# Patient Record
Sex: MALE | Race: WHITE | NOT HISPANIC OR LATINO | Employment: FULL TIME | URBAN - METROPOLITAN AREA
[De-identification: names, ages, dates, MRNs, and addresses within clinical notes are randomized per-mention and may not be internally consistent; named-entity substitution may affect disease eponyms.]

---

## 2022-01-19 ENCOUNTER — OFFICE VISIT (OUTPATIENT)
Dept: PHYSICAL MEDICINE AND REHAB | Facility: CLINIC | Age: 37
End: 2022-01-19
Payer: MEDICAID

## 2022-01-19 VITALS — WEIGHT: 190 LBS | BODY MASS INDEX: 29.82 KG/M2 | RESPIRATION RATE: 18 BRPM | HEIGHT: 67 IN

## 2022-01-19 DIAGNOSIS — M51.16 LUMBAR DISC HERNIATION WITH RADICULOPATHY: Primary | ICD-10-CM

## 2022-01-19 DIAGNOSIS — M43.06 LUMBAR PARS DEFECT: ICD-10-CM

## 2022-01-19 DIAGNOSIS — R20.2 PARESTHESIA: ICD-10-CM

## 2022-01-19 DIAGNOSIS — M54.17 LUMBOSACRAL RADICULOPATHY AT L4: ICD-10-CM

## 2022-01-19 PROCEDURE — 99213 OFFICE O/P EST LOW 20 MIN: CPT | Mod: PBBFAC,PN | Performed by: PHYSICAL MEDICINE & REHABILITATION

## 2022-01-19 PROCEDURE — 99999 PR PBB SHADOW E&M-EST. PATIENT-LVL III: CPT | Mod: PBBFAC,,, | Performed by: PHYSICAL MEDICINE & REHABILITATION

## 2022-01-19 PROCEDURE — 99999 PR PBB SHADOW E&M-EST. PATIENT-LVL III: ICD-10-PCS | Mod: PBBFAC,,, | Performed by: PHYSICAL MEDICINE & REHABILITATION

## 2022-01-19 PROCEDURE — 1160F RVW MEDS BY RX/DR IN RCRD: CPT | Mod: CPTII,,, | Performed by: PHYSICAL MEDICINE & REHABILITATION

## 2022-01-19 PROCEDURE — 99204 PR OFFICE/OUTPT VISIT, NEW, LEVL IV, 45-59 MIN: ICD-10-PCS | Mod: S$PBB,,, | Performed by: PHYSICAL MEDICINE & REHABILITATION

## 2022-01-19 PROCEDURE — 99204 OFFICE O/P NEW MOD 45 MIN: CPT | Mod: S$PBB,,, | Performed by: PHYSICAL MEDICINE & REHABILITATION

## 2022-01-19 PROCEDURE — 1159F MED LIST DOCD IN RCRD: CPT | Mod: CPTII,,, | Performed by: PHYSICAL MEDICINE & REHABILITATION

## 2022-01-19 PROCEDURE — 1159F PR MEDICATION LIST DOCUMENTED IN MEDICAL RECORD: ICD-10-PCS | Mod: CPTII,,, | Performed by: PHYSICAL MEDICINE & REHABILITATION

## 2022-01-19 PROCEDURE — 1160F PR REVIEW ALL MEDS BY PRESCRIBER/CLIN PHARMACIST DOCUMENTED: ICD-10-PCS | Mod: CPTII,,, | Performed by: PHYSICAL MEDICINE & REHABILITATION

## 2022-01-19 PROCEDURE — 3008F BODY MASS INDEX DOCD: CPT | Mod: CPTII,,, | Performed by: PHYSICAL MEDICINE & REHABILITATION

## 2022-01-19 PROCEDURE — 3008F PR BODY MASS INDEX (BMI) DOCUMENTED: ICD-10-PCS | Mod: CPTII,,, | Performed by: PHYSICAL MEDICINE & REHABILITATION

## 2022-01-19 RX ORDER — PREDNISONE 20 MG/1
40 TABLET ORAL DAILY
COMMUNITY
Start: 2022-01-08

## 2022-01-19 RX ORDER — CYCLOBENZAPRINE HCL 10 MG
10 TABLET ORAL 3 TIMES DAILY PRN
Qty: 45 TABLET | Refills: 0 | Status: SHIPPED | OUTPATIENT
Start: 2022-01-19

## 2022-01-19 RX ORDER — IBUPROFEN 800 MG/1
800 TABLET ORAL EVERY 8 HOURS PRN
COMMUNITY
Start: 2022-01-08

## 2022-01-19 RX ORDER — TRAMADOL HYDROCHLORIDE 50 MG/1
50 TABLET ORAL EVERY 8 HOURS PRN
Qty: 20 TABLET | Refills: 0 | OUTPATIENT
Start: 2022-01-19 | End: 2022-06-01

## 2022-01-20 NOTE — PROGRESS NOTES
Chief Complaint   Patient presents with    Back Pain     Low back pain    Leg Pain     L leg tingling/burning         HPI: North Mendoza is a  36 y.o. male who presents today for evaluation and treatment of acute left-sided low back pain and left-sided radicular symptoms.  He states that on Friday he was pulling a spring at of the vault forward and felt like there is going to be significant resistance, but there was none.  He quickly jerked his back.  He felt a pop in the left side of his low back in the SI joint region.  The pain in the low back improved, but he developed left buttock pain radiating into the anterior thigh into the medial lower leg with numbness and tingling in the foot.  He states that his left quad feels swollen.  He complains of numbness and tingling/burning in the anterior thigh.  Pain is worse with leaning forward, hip extension.  He denies any weakness of the lower extremities.  He has started a prednisone pack with some improvement in symptoms.  He has been taking ibuprofen 800 mg 3 times daily with some improvement in pain and function.  He denies any bowel or bladder dysfunction, saddle anesthesia, or lower extremity weakness.        Review of Systems   Constitutional: Negative for chills and fever.   HENT: Negative for congestion and tinnitus.    Eyes: Negative for blurred vision and photophobia.   Respiratory: Negative for shortness of breath and wheezing.    Cardiovascular: Negative for chest pain and palpitations.   Gastrointestinal: Negative for nausea and vomiting.   Genitourinary: Negative for dysuria and frequency.   Musculoskeletal: Positive for back pain. Negative for joint pain and myalgias.   Skin: Negative for itching and rash.   Neurological: Positive for tingling and sensory change. Negative for speech change and weakness.   Endo/Heme/Allergies: Negative for environmental allergies. Does not bruise/bleed easily.   Psychiatric/Behavioral: Negative for depression. The patient  is not nervous/anxious.             No past medical history on file.       Current Outpatient Medications on File Prior to Visit   Medication Sig Dispense Refill    ibuprofen (ADVIL,MOTRIN) 800 MG tablet Take 800 mg by mouth every 8 (eight) hours as needed.      predniSONE (DELTASONE) 20 MG tablet Take 40 mg by mouth once daily.       No current facility-administered medications on file prior to visit.              Physical Exam:  Vitals:    01/19/22 1356   Resp: 18     Physical Exam  Constitutional:       General: He is not in acute distress.     Appearance: Normal appearance.   HENT:      Head: Normocephalic and atraumatic.      Nose: Nose normal. No congestion.      Mouth/Throat:      Mouth: Mucous membranes are moist.      Pharynx: Oropharynx is clear.   Eyes:      Extraocular Movements: Extraocular movements intact.      Pupils: Pupils are equal, round, and reactive to light.   Neck:      Trachea: Trachea and phonation normal.   Pulmonary:      Effort: Pulmonary effort is normal. No respiratory distress.   Abdominal:      General: Abdomen is flat. There is no distension.   Musculoskeletal:      Lumbar back: Spasms, tenderness and bony tenderness (Left lower facets) present. Decreased range of motion (Pain with extension and rotation to the left greater than the right.  Range of motion with flexion normal). Negative right straight leg raise test and negative left straight leg raise test.        Back:       Comments: Positive femoral nerve tension test on the left.   Skin:     General: Skin is warm and dry.   Neurological:      General: No focal deficit present.      Mental Status: He is alert and oriented to person, place, and time.      Cranial Nerves: Cranial nerves are intact.      Sensory: Sensory deficit (Left L4 dermatome) present.      Motor: Motor function is intact.      Gait: Gait is intact.      Deep Tendon Reflexes:      Reflex Scores:       Patellar reflexes are 3+ on the right side and 1+ on the  left side.       Achilles reflexes are 2+ on the right side and 2+ on the left side.     Comments: Negative clonus   Psychiatric:         Mood and Affect: Mood and affect normal.         Behavior: Behavior normal.       Back Exam     Tests   Straight leg raise right: negative  Straight leg raise left: negative                  Assessment:  Lumbar disc herniation with radiculopathy  -     cyclobenzaprine (FLEXERIL) 10 MG tablet; Take 1 tablet (10 mg total) by mouth 3 (three) times daily as needed for Muscle spasms.  Dispense: 45 tablet; Refill: 0  -     traMADoL (ULTRAM) 50 mg tablet; Take 1 tablet (50 mg total) by mouth every 8 (eight) hours as needed for Pain.  Dispense: 20 tablet; Refill: 0  -     Ambulatory referral/consult to Physical/Occupational Therapy; Future; Expected date: 01/26/2022    Lumbar pars defect  -     X-Ray Lumbar Spine 5 View; Future; Expected date: 01/19/2022    Lumbosacral radiculopathy at L4    Paresthesia               PLAN:  North Mendoza is a 36 y.o. male with acute left-sided low back pain and left-sided radicular symptoms.  History and physical examination is likely consistent with an L4 radiculopathy.  He fortunately has no red flag signs or symptoms on history or physical examination.  At this time, recommend that he continue with the prednisone taper as prescribed.  I will prescribed him Flexeril 10 mg t.i.d. p.r.n. for muscle spasm in neuropathic pain.  He can take tramadol 50 mg t.i.d. p.r.n. for severe breakthrough pain.  Lastly, will prescribe him physical therapy to focus mainly on Félix extension based exercises.  I will see him back in 4-6 weeks.  Should his pain continue or worsen, I advised him to notify myself.  He was counseled on red flag signs or symptoms.  Lastly, given his significant athletic history with gymnastics, I will order an x-ray of the lumbar spine to assess for spondylolysis or spondylolisthesis.                      Jovanni Montez D.O.  Physical  Medicine and Rehabilitation

## 2022-03-08 ENCOUNTER — OFFICE VISIT (OUTPATIENT)
Dept: PHYSICAL MEDICINE AND REHAB | Facility: CLINIC | Age: 37
End: 2022-03-08
Payer: MEDICAID

## 2022-03-08 VITALS — BODY MASS INDEX: 29.82 KG/M2 | HEIGHT: 67 IN | WEIGHT: 190 LBS | RESPIRATION RATE: 18 BRPM

## 2022-03-08 DIAGNOSIS — M54.16 LUMBAR RADICULOPATHY: Primary | ICD-10-CM

## 2022-03-08 DIAGNOSIS — G89.29 CHRONIC LEFT-SIDED LOW BACK PAIN WITH LEFT-SIDED SCIATICA: ICD-10-CM

## 2022-03-08 DIAGNOSIS — M54.42 CHRONIC LEFT-SIDED LOW BACK PAIN WITH LEFT-SIDED SCIATICA: ICD-10-CM

## 2022-03-08 PROCEDURE — 99999 PR PBB SHADOW E&M-EST. PATIENT-LVL III: ICD-10-PCS | Mod: PBBFAC,,, | Performed by: PHYSICAL MEDICINE & REHABILITATION

## 2022-03-08 PROCEDURE — 99213 OFFICE O/P EST LOW 20 MIN: CPT | Mod: PBBFAC,PN | Performed by: PHYSICAL MEDICINE & REHABILITATION

## 2022-03-08 PROCEDURE — 1160F RVW MEDS BY RX/DR IN RCRD: CPT | Mod: CPTII,,, | Performed by: PHYSICAL MEDICINE & REHABILITATION

## 2022-03-08 PROCEDURE — 1160F PR REVIEW ALL MEDS BY PRESCRIBER/CLIN PHARMACIST DOCUMENTED: ICD-10-PCS | Mod: CPTII,,, | Performed by: PHYSICAL MEDICINE & REHABILITATION

## 2022-03-08 PROCEDURE — 3008F PR BODY MASS INDEX (BMI) DOCUMENTED: ICD-10-PCS | Mod: CPTII,,, | Performed by: PHYSICAL MEDICINE & REHABILITATION

## 2022-03-08 PROCEDURE — 99213 PR OFFICE/OUTPT VISIT, EST, LEVL III, 20-29 MIN: ICD-10-PCS | Mod: S$PBB,,, | Performed by: PHYSICAL MEDICINE & REHABILITATION

## 2022-03-08 PROCEDURE — 1159F MED LIST DOCD IN RCRD: CPT | Mod: CPTII,,, | Performed by: PHYSICAL MEDICINE & REHABILITATION

## 2022-03-08 PROCEDURE — 1159F PR MEDICATION LIST DOCUMENTED IN MEDICAL RECORD: ICD-10-PCS | Mod: CPTII,,, | Performed by: PHYSICAL MEDICINE & REHABILITATION

## 2022-03-08 PROCEDURE — 99213 OFFICE O/P EST LOW 20 MIN: CPT | Mod: S$PBB,,, | Performed by: PHYSICAL MEDICINE & REHABILITATION

## 2022-03-08 PROCEDURE — 99999 PR PBB SHADOW E&M-EST. PATIENT-LVL III: CPT | Mod: PBBFAC,,, | Performed by: PHYSICAL MEDICINE & REHABILITATION

## 2022-03-08 PROCEDURE — 3008F BODY MASS INDEX DOCD: CPT | Mod: CPTII,,, | Performed by: PHYSICAL MEDICINE & REHABILITATION

## 2022-03-08 NOTE — PROGRESS NOTES
Chief Complaint   Patient presents with    Follow-up         HPI: North Mendoza is a  36 y.o. male who presents today for followup. he was last seen in clinic at which time I suspected a left L4 radiculopathy.  On this visit, he states that he is doing fairly well.  He had 1 episode of distal quadriceps weakness/burning a week ago after some single leg squats which has resolved.  He denies any new worsening bowel or bladder dysfunction, saddle anesthesia, or lower extremity weakness at this time.  Pain is a 0/10        Review of Systems   Constitutional: Negative for chills and fever.   HENT: Negative for congestion and tinnitus.    Eyes: Negative for blurred vision and photophobia.   Respiratory: Negative for shortness of breath and wheezing.    Cardiovascular: Negative for chest pain and palpitations.   Gastrointestinal: Negative for nausea and vomiting.   Genitourinary: Negative for dysuria and frequency.   Musculoskeletal: Negative for back pain, joint pain and myalgias.   Skin: Negative for itching and rash.   Neurological: Negative for tingling, sensory change, speech change and weakness.   Endo/Heme/Allergies: Negative for environmental allergies. Does not bruise/bleed easily.   Psychiatric/Behavioral: Negative for depression. The patient is not nervous/anxious.             No past medical history on file.       Current Outpatient Medications on File Prior to Visit   Medication Sig Dispense Refill    cyclobenzaprine (FLEXERIL) 10 MG tablet Take 1 tablet (10 mg total) by mouth 3 (three) times daily as needed for Muscle spasms. 45 tablet 0    ibuprofen (ADVIL,MOTRIN) 800 MG tablet Take 800 mg by mouth every 8 (eight) hours as needed.      predniSONE (DELTASONE) 20 MG tablet Take 40 mg by mouth once daily.      traMADoL (ULTRAM) 50 mg tablet Take 1 tablet (50 mg total) by mouth every 8 (eight) hours as needed for Pain. 20 tablet 0     No current facility-administered medications on file prior to visit.               Physical Exam:  Vitals:    03/08/22 0836   Resp: 18     Physical Exam  Constitutional:       General: He is not in acute distress.     Appearance: Normal appearance.   HENT:      Head: Normocephalic and atraumatic.      Nose: Nose normal. No congestion.      Mouth/Throat:      Mouth: Mucous membranes are moist.      Pharynx: Oropharynx is clear.   Eyes:      Extraocular Movements: Extraocular movements intact.      Pupils: Pupils are equal, round, and reactive to light.   Neck:      Trachea: Trachea and phonation normal.   Pulmonary:      Effort: Pulmonary effort is normal. No respiratory distress.   Abdominal:      General: Abdomen is flat. There is no distension.   Skin:     General: Skin is warm and dry.   Neurological:      General: No focal deficit present.      Mental Status: He is alert and oriented to person, place, and time.      Cranial Nerves: Cranial nerves are intact.      Sensory: Sensation is intact.      Motor: Motor function is intact.      Gait: Gait is intact.      Deep Tendon Reflexes:      Reflex Scores:       Patellar reflexes are 3+ on the right side and 2+ on the left side.       Achilles reflexes are 2+ on the right side and 2+ on the left side.  Psychiatric:         Mood and Affect: Mood and affect normal.         Behavior: Behavior normal.       Ortho Exam          Assessment:  Lumbar radiculopathy    Chronic left-sided low back pain with left-sided sciatica               PLAN:  North Mendoza is a 36 y.o. male with history of low back pain and left-sided radicular symptoms what sounded like an L4 dermatomal distribution.  On this visit, he complains of some left distal quadriceps weakness which has improved.  On today's visit, sensation, strength, and reflexes were within normal limits.  At this time, recommend continuing activity as tolerated.  Should his symptoms continue or worsen, I will order a EMG of the left lower extremity focusing on the quadriceps and tibialis anterior.   Otherwise return to clinic as needed                      Jovanni Montez D.O.  Physical Medicine and Rehabilitation

## 2022-06-02 ENCOUNTER — OFFICE VISIT (OUTPATIENT)
Dept: PHYSICAL MEDICINE AND REHAB | Facility: CLINIC | Age: 37
End: 2022-06-02
Payer: MEDICAID

## 2022-06-02 VITALS — RESPIRATION RATE: 18 BRPM | HEIGHT: 67 IN | WEIGHT: 190 LBS | BODY MASS INDEX: 29.82 KG/M2

## 2022-06-02 DIAGNOSIS — M54.9 DORSALGIA, UNSPECIFIED: ICD-10-CM

## 2022-06-02 DIAGNOSIS — M54.42 CHRONIC BILATERAL LOW BACK PAIN WITH LEFT-SIDED SCIATICA: ICD-10-CM

## 2022-06-02 DIAGNOSIS — G89.29 CHRONIC BILATERAL LOW BACK PAIN WITH LEFT-SIDED SCIATICA: ICD-10-CM

## 2022-06-02 DIAGNOSIS — M54.17 LUMBOSACRAL RADICULOPATHY AT L4: Primary | ICD-10-CM

## 2022-06-02 PROCEDURE — 99213 OFFICE O/P EST LOW 20 MIN: CPT | Mod: PBBFAC,PN | Performed by: PHYSICAL MEDICINE & REHABILITATION

## 2022-06-02 PROCEDURE — 1159F MED LIST DOCD IN RCRD: CPT | Mod: CPTII,,, | Performed by: PHYSICAL MEDICINE & REHABILITATION

## 2022-06-02 PROCEDURE — 3008F PR BODY MASS INDEX (BMI) DOCUMENTED: ICD-10-PCS | Mod: CPTII,,, | Performed by: PHYSICAL MEDICINE & REHABILITATION

## 2022-06-02 PROCEDURE — 3008F BODY MASS INDEX DOCD: CPT | Mod: CPTII,,, | Performed by: PHYSICAL MEDICINE & REHABILITATION

## 2022-06-02 PROCEDURE — 99999 PR PBB SHADOW E&M-EST. PATIENT-LVL III: ICD-10-PCS | Mod: PBBFAC,,, | Performed by: PHYSICAL MEDICINE & REHABILITATION

## 2022-06-02 PROCEDURE — 99999 PR PBB SHADOW E&M-EST. PATIENT-LVL III: CPT | Mod: PBBFAC,,, | Performed by: PHYSICAL MEDICINE & REHABILITATION

## 2022-06-02 PROCEDURE — 99214 OFFICE O/P EST MOD 30 MIN: CPT | Mod: S$PBB,,, | Performed by: PHYSICAL MEDICINE & REHABILITATION

## 2022-06-02 PROCEDURE — 1159F PR MEDICATION LIST DOCUMENTED IN MEDICAL RECORD: ICD-10-PCS | Mod: CPTII,,, | Performed by: PHYSICAL MEDICINE & REHABILITATION

## 2022-06-02 PROCEDURE — 99214 PR OFFICE/OUTPT VISIT, EST, LEVL IV, 30-39 MIN: ICD-10-PCS | Mod: S$PBB,,, | Performed by: PHYSICAL MEDICINE & REHABILITATION

## 2022-06-02 RX ORDER — DIAZEPAM 5 MG/1
5 TABLET ORAL EVERY 6 HOURS PRN
Qty: 1 TABLET | Refills: 0 | Status: SHIPPED | OUTPATIENT
Start: 2022-06-02 | End: 2022-08-12 | Stop reason: ALTCHOICE

## 2022-06-02 NOTE — PROGRESS NOTES
Chief Complaint   Patient presents with    Back Pain    Follow-up         HPI: North Mendoza is a  36 y.o. male who presents today for evaluation of acute on chronic low back pain.  He had initial symptoms many months ago which improved with home exercises.  He had return in symptoms approximately 1 week ago.  He complains of worsening low back pain.  Pain is a deep dull achy nonradiating pain.  He denies any numbness or tingling in the legs or feet.  Denies any weakness of the lower extremities.  No saddle anesthesia.  Pain on average is a 6/10.  He was evaluated in the emergency room and prescribed Toradol and tramadol.  This has been helping symptomatically with his symptoms.  Pain is significantly limited and activity living and functional mobility        Review of Systems   Constitutional: Negative for chills and fever.   HENT: Negative for congestion and tinnitus.    Eyes: Negative for blurred vision and photophobia.   Respiratory: Negative for shortness of breath and wheezing.    Cardiovascular: Negative for chest pain and palpitations.   Gastrointestinal: Negative for nausea and vomiting.   Genitourinary: Negative for dysuria and frequency.   Musculoskeletal: Positive for back pain. Negative for joint pain and myalgias.   Skin: Negative for itching and rash.   Neurological: Negative for speech change and focal weakness.   Endo/Heme/Allergies: Negative for environmental allergies. Does not bruise/bleed easily.   Psychiatric/Behavioral: Negative for depression. The patient is not nervous/anxious.             History reviewed. No pertinent past medical history.       Current Outpatient Medications on File Prior to Visit   Medication Sig Dispense Refill    diclofenac sodium (VOLTAREN) 1 % Gel Apply 2 g topically 4 (four) times daily. 150 g 0    ibuprofen (ADVIL,MOTRIN) 800 MG tablet Take 800 mg by mouth every 8 (eight) hours as needed.      ketorolac (TORADOL) 10 mg tablet Take 1 tablet (10 mg total)  by mouth every 6 (six) hours. for 5 days 20 tablet 0    predniSONE (DELTASONE) 20 MG tablet Take 40 mg by mouth once daily.      traMADoL (ULTRAM) 50 mg tablet Take 1 tablet (50 mg total) by mouth every 6 (six) hours as needed for Pain. 12 tablet 0    cyclobenzaprine (FLEXERIL) 10 MG tablet Take 1 tablet (10 mg total) by mouth 3 (three) times daily as needed for Muscle spasms. (Patient not taking: Reported on 6/2/2022) 45 tablet 0     Current Facility-Administered Medications on File Prior to Visit   Medication Dose Route Frequency Provider Last Rate Last Admin    [COMPLETED] ketorolac injection 30 mg  30 mg Intramuscular ED 1 Time Bianca MooreJULIA   30 mg at 06/01/22 1148              Physical Exam:  Vitals:    06/02/22 1110   Resp: 18     Physical Exam  Constitutional:       General: He is not in acute distress.     Appearance: Normal appearance.   HENT:      Head: Normocephalic and atraumatic.      Nose: Nose normal. No congestion.      Mouth/Throat:      Mouth: Mucous membranes are moist.      Pharynx: Oropharynx is clear.   Eyes:      Extraocular Movements: Extraocular movements intact.      Pupils: Pupils are equal, round, and reactive to light.   Neck:      Trachea: Trachea and phonation normal.   Pulmonary:      Effort: Pulmonary effort is normal. No respiratory distress.   Abdominal:      General: Abdomen is flat. There is no distension.   Musculoskeletal:      Lumbar back: Tenderness and bony tenderness (Lower lumbar spine around the L4-5 region) present. Decreased range of motion (Extension normal.  Flexion to 20° with reproduction in symptoms.). Negative right straight leg raise test and negative left straight leg raise test.        Back:    Skin:     General: Skin is warm and dry.   Neurological:      General: No focal deficit present.      Mental Status: He is alert and oriented to person, place, and time.      Cranial Nerves: Cranial nerves are intact.      Sensory: Sensory deficit present.       Motor: Motor function is intact.      Gait: Gait is intact.      Deep Tendon Reflexes:      Reflex Scores:       Patellar reflexes are 3+ on the right side and 2+ on the left side.       Achilles reflexes are 3+ on the right side and 3+ on the left side.     Comments: Negative clonus   Psychiatric:         Mood and Affect: Mood and affect normal.         Behavior: Behavior normal.       Back Exam     Tests   Straight leg raise right: negative  Straight leg raise left: negative                  Assessment:  Lumbosacral radiculopathy at L4  -     Ambulatory referral/consult to Pain Clinic; Future; Expected date: 06/09/2022    Dorsalgia, unspecified  -     MRI Lumbar Spine Without Contrast; Future; Expected date: 06/02/2022    Chronic bilateral low back pain with left-sided sciatica    Other orders  -     diazePAM (VALIUM) 5 MG tablet; Take 1 tablet (5 mg total) by mouth every 6 (six) hours as needed for Anxiety. Take one hour before MRI.  Must have  if take  Dispense: 1 tablet; Refill: 0               PLAN:  North Mendoza is a 36 y.o. male with acute on chronic low back pain.  History physical examination is likely consistent with disc herniation with L4 nerve compression on the left.  He has decreased reflex on the left on the patella compared to the right and altered sensation in the left anterior quad.  His pain is on average greater than a 6/10.  He has been taking anti-inflammatories with some decrease in pain.  His pain limits his activities daily living and functional mobility.  He has been performing home exercises as prescribed without any significant lasting improvement in pain or function.  At this time, I will order an MRI of the lumbar spine without contrast.  To assess fora potential left L4 nerve compression                      Jovanni Montez D.O.  Physical Medicine and Rehabilitation

## 2022-06-14 ENCOUNTER — PATIENT MESSAGE (OUTPATIENT)
Dept: PHYSICAL MEDICINE AND REHAB | Facility: CLINIC | Age: 37
End: 2022-06-14
Payer: MEDICAID

## 2022-06-15 ENCOUNTER — TELEPHONE (OUTPATIENT)
Dept: PAIN MEDICINE | Facility: CLINIC | Age: 37
End: 2022-06-15
Payer: MEDICAID

## 2022-06-15 NOTE — TELEPHONE ENCOUNTER
----- Message from Jesi Suárez LPN sent at 6/14/2022  2:05 PM CDT -----  Dr Montez is going to send you a message on this.   ----- Message -----  From: Mackenzie Adan MA  Sent: 6/14/2022   1:42 PM CDT  To: Sal Arredondo

## 2022-06-15 NOTE — TELEPHONE ENCOUNTER
Please contact the patient and get him scheduled with me for a visit.  I need to see him in order to discuss setting up an epidural steroid injection and trying to get this covered by insurance

## 2022-06-16 NOTE — TELEPHONE ENCOUNTER
Call placed to schedule patient per Dr. Huang, to get him seen  in order to discuss setting up an epidural steroid injection and trying to get this covered by insurance. Pt scheduled for 06-24-22. Date, time and location confirmed.

## 2022-06-24 ENCOUNTER — OFFICE VISIT (OUTPATIENT)
Dept: PAIN MEDICINE | Facility: CLINIC | Age: 37
End: 2022-06-24
Payer: MEDICAID

## 2022-06-24 VITALS
DIASTOLIC BLOOD PRESSURE: 93 MMHG | WEIGHT: 190.06 LBS | SYSTOLIC BLOOD PRESSURE: 147 MMHG | HEART RATE: 71 BPM | HEIGHT: 68 IN | BODY MASS INDEX: 28.8 KG/M2

## 2022-06-24 DIAGNOSIS — M51.36 DDD (DEGENERATIVE DISC DISEASE), LUMBAR: ICD-10-CM

## 2022-06-24 DIAGNOSIS — M54.16 LEFT LUMBAR RADICULOPATHY: Primary | ICD-10-CM

## 2022-06-24 DIAGNOSIS — M54.16 LUMBAR RADICULOPATHY: Primary | ICD-10-CM

## 2022-06-24 PROCEDURE — 3080F PR MOST RECENT DIASTOLIC BLOOD PRESSURE >= 90 MM HG: ICD-10-PCS | Mod: CPTII,,, | Performed by: ANESTHESIOLOGY

## 2022-06-24 PROCEDURE — 3080F DIAST BP >= 90 MM HG: CPT | Mod: CPTII,,, | Performed by: ANESTHESIOLOGY

## 2022-06-24 PROCEDURE — 1159F MED LIST DOCD IN RCRD: CPT | Mod: CPTII,,, | Performed by: ANESTHESIOLOGY

## 2022-06-24 PROCEDURE — 3008F BODY MASS INDEX DOCD: CPT | Mod: CPTII,,, | Performed by: ANESTHESIOLOGY

## 2022-06-24 PROCEDURE — 99213 OFFICE O/P EST LOW 20 MIN: CPT | Mod: PBBFAC,PN | Performed by: ANESTHESIOLOGY

## 2022-06-24 PROCEDURE — 99204 OFFICE O/P NEW MOD 45 MIN: CPT | Mod: S$PBB,,, | Performed by: ANESTHESIOLOGY

## 2022-06-24 PROCEDURE — 3077F SYST BP >= 140 MM HG: CPT | Mod: CPTII,,, | Performed by: ANESTHESIOLOGY

## 2022-06-24 PROCEDURE — 3077F PR MOST RECENT SYSTOLIC BLOOD PRESSURE >= 140 MM HG: ICD-10-PCS | Mod: CPTII,,, | Performed by: ANESTHESIOLOGY

## 2022-06-24 PROCEDURE — 99999 PR PBB SHADOW E&M-EST. PATIENT-LVL III: CPT | Mod: PBBFAC,,, | Performed by: ANESTHESIOLOGY

## 2022-06-24 PROCEDURE — 99999 PR PBB SHADOW E&M-EST. PATIENT-LVL III: ICD-10-PCS | Mod: PBBFAC,,, | Performed by: ANESTHESIOLOGY

## 2022-06-24 PROCEDURE — 3008F PR BODY MASS INDEX (BMI) DOCUMENTED: ICD-10-PCS | Mod: CPTII,,, | Performed by: ANESTHESIOLOGY

## 2022-06-24 PROCEDURE — 1159F PR MEDICATION LIST DOCUMENTED IN MEDICAL RECORD: ICD-10-PCS | Mod: CPTII,,, | Performed by: ANESTHESIOLOGY

## 2022-06-24 PROCEDURE — 99204 PR OFFICE/OUTPT VISIT, NEW, LEVL IV, 45-59 MIN: ICD-10-PCS | Mod: S$PBB,,, | Performed by: ANESTHESIOLOGY

## 2022-06-24 RX ORDER — ALPRAZOLAM 0.5 MG/1
1 TABLET, ORALLY DISINTEGRATING ORAL ONCE AS NEEDED
Status: CANCELLED | OUTPATIENT
Start: 2022-06-24 | End: 2033-11-20

## 2022-06-24 NOTE — H&P (VIEW-ONLY)
This note was completed with dictation software and grammatical errors may exist.    Chief Complaint   Patient presents with    Back Pain        HPI: North Mendoza is a 36 y.o. year old male patient who has no past medical history on file. He presents in referral from Dr. Jovanni Motnez for back and left leg pain.  Patient reports that in January he was pulling a spring out of a vault during in gymnastics meet and he felt a pop in his back and had sudden back pain and left leg pain.  He sought care and was given anti-inflammatories and worked on home exercises directed by his physician at the time, Félix exercises and the pain gradually resolved after about a month.  He was actually doing fairly well without any major issues and then had several episodes in February but in June had a return of the severe pain in the back and left leg to the point where he was having difficulty walking.  He has continued these exercises on his own which she does get some relief with extension but he continues to have severe pain.  He is having difficulty sitting for very long, sometimes difficulty with walking, is a  for his job and feels that the left leg is weak and is going to give out at times.  He denies any bowel or bladder incontinence.  The pain is been in the back, lateral left leg, anterior thigh and at times on the medial lower left leg.  He describes the pain as aching, throbbing, tight, numb and shooting.  Is worse with sitting bending, improved with arching his back.      Pain intervention history:    Spine surgeries:    Antineuropathics:  NSAIDs:  Ibuprofen, prednisone  Physical therapy:  Antidepressants:  Muscle relaxers:  Flexeril  Opioids:  Tramadol  Antiplatelets/Anticoagulants:        ROS:  He reports back pain only.  Balance of review of systems is negative.    No results found for: LABA1C, HGBA1C    No results found for: WBC, HGB, HCT, MCV, PLT          History reviewed. No pertinent  "past medical history.    History reviewed. No pertinent surgical history.    Social History     Socioeconomic History    Marital status:    Tobacco Use    Smoking status: Never Smoker    Smokeless tobacco: Never Used   Social History Narrative    ** Merged History Encounter **              Medications/Allergies: See med card    Vitals:    22 0808   BP: (!) 147/93   Pulse: 71   Weight: 86.2 kg (190 lb 0.6 oz)   Height: 5' 8.4" (1.737 m)   PainSc: 0-No pain   PainLoc: Back     Body mass index is 28.56 kg/m².    Physical exam:    Gen: A and O x3, pleasant, well-groomed  Skin: No rashes or obvious lesions  HEENT: PERRLA, no obvious deformities on ears or in canals. Trachea midline.  CVS: Regular rate and rhythm, normal palpable pulses.  Resp:No increased work of breathing, symmetrical chest rise.  Abdomen: Soft, NT/ND.  Musculoskeletal:  No antalgic gait.           Neuro:    Iliopsoas Quadriceps Knee  Flexion Tibialis  anterior Gastro- cnemius EHL   Lower: R 5/5 5/5 5/5 5/5 5/5 5/5    L 5/5 4/5 5/5 5/5 5/5 5/5      Left  Right    Patellar DTR 1+ 1+   Achilles DTR 2+ 2+   Mariano Absent  Absent   Clonus Absent Absent   Babinski Absent Absent     Intact and symmetrical to light touch and pinprick in L1-S1 dermatomes bilaterally.    Lumbar spine:  Lumbar spine: ROM is full with flexion extension and oblique extension with increased pain on flexion.    Valentin's test causes no increased pain on either side.    Supine straight leg raise is positive for left leg pain at 45°.    Internal and external rotation of the hip causes no increased pain on either side.  Myofascial exam: No tenderness to palpation across lumbar paraspinous muscles.    Imagin22 MRI L-spine:  There is a trace retrolisthesis of L5 on S1.  Alignment of the rest of the vertebral bodies is within normal limits.  No marrow abnormalities to suggest acute fractures or neoplastic processes.  The tip of the conus is at T12 level.  " Paraspinal soft tissues are unremarkable.  Paraspinal soft tissues are unremarkable.   L5-S1: Approximately 2 mm retrolisthesis of L5 on S1.  There is a mild posterior bulging of the annulus.  No soft tissue disc herniations or significant central canal spinal stenosis or significant foraminal narrowing.   L4-5: There is mild disc space narrowing.  There is a left paracentral disc extrusion with craniad extension of the extrusion behind the inferior endplate of L4.  This results in mild compression of the thecal sac.  There is also mild left lateral recess stenosis.  Mild left foraminal stenosis.  Right neural foramen is unremarkable.  There is facet arthropathy.   L3-4: There is no disc herniations or significant central canal spinal stenosis or significant foraminal stenosis.  Mild facet arthropathy.   L2-3: No disc herniations or central canal spinal stenosis or foraminal narrowing.   L1-2: There is a small left paracentral disc protrusion with mild compression of thecal sac.  No significant central canal spinal stenosis.  There is no significant neural foraminal stenosis.   T12-L1: Schmorl's node along the inferior endplate of T12.  There is no central canal disc herniations or central canal spinal stenosis or foraminal stenotic disease.    Assessment:   North Mendoza is a 36 y.o. year old male patient who has no past medical history on file. He presents in referral from Dr. Jovanni Montez for back and left leg pain.    1. Left lumbar radiculopathy     2. DDD (degenerative disc disease), lumbar         Plan:  1.  We reviewed his symptoms, physical exam, MRI and he does have a disc extrusion at L4/5 which extends cephalad and contacts the L4 nerve root.  He also has some mild foraminal narrowing at L3/4 off to the left side.  His pain is corresponding to his left L4 nerve root but also seems to have possibly have slight weakness in his left quadricep.  We discussed that he has tried physician directed  exercises for greater than a month, anti-inflammatories, time and since the pain continues, her recommend that we try an epidural steroid injection and he would like to proceed.  I will set him up for an L4/5 LISA with spread to the left side.  We discussed that if he has complete relief that we will slowly get him back in to his previous exercise routine.  If he has relief but not 100%, we may repeat this.  We discussed that he does not seem to have any jermaine weakness but if he develops weakness, we will refer him to Neurosurgery but hopefully we can avoid this.    Thank you for referring this interesting patient, and I look forward to continuing to collaborate in his care.

## 2022-06-24 NOTE — PROGRESS NOTES
This note was completed with dictation software and grammatical errors may exist.    Chief Complaint   Patient presents with    Back Pain        HPI: North Mendoza is a 36 y.o. year old male patient who has no past medical history on file. He presents in referral from Dr. Jovanni Montez for back and left leg pain.  Patient reports that in January he was pulling a spring out of a vault during in gymnastics meet and he felt a pop in his back and had sudden back pain and left leg pain.  He sought care and was given anti-inflammatories and worked on home exercises directed by his physician at the time, Félix exercises and the pain gradually resolved after about a month.  He was actually doing fairly well without any major issues and then had several episodes in February but in June had a return of the severe pain in the back and left leg to the point where he was having difficulty walking.  He has continued these exercises on his own which she does get some relief with extension but he continues to have severe pain.  He is having difficulty sitting for very long, sometimes difficulty with walking, is a  for his job and feels that the left leg is weak and is going to give out at times.  He denies any bowel or bladder incontinence.  The pain is been in the back, lateral left leg, anterior thigh and at times on the medial lower left leg.  He describes the pain as aching, throbbing, tight, numb and shooting.  Is worse with sitting bending, improved with arching his back.      Pain intervention history:    Spine surgeries:    Antineuropathics:  NSAIDs:  Ibuprofen, prednisone  Physical therapy:  Antidepressants:  Muscle relaxers:  Flexeril  Opioids:  Tramadol  Antiplatelets/Anticoagulants:        ROS:  He reports back pain only.  Balance of review of systems is negative.    No results found for: LABA1C, HGBA1C    No results found for: WBC, HGB, HCT, MCV, PLT          History reviewed. No pertinent  "past medical history.    History reviewed. No pertinent surgical history.    Social History     Socioeconomic History    Marital status:    Tobacco Use    Smoking status: Never Smoker    Smokeless tobacco: Never Used   Social History Narrative    ** Merged History Encounter **              Medications/Allergies: See med card    Vitals:    22 0808   BP: (!) 147/93   Pulse: 71   Weight: 86.2 kg (190 lb 0.6 oz)   Height: 5' 8.4" (1.737 m)   PainSc: 0-No pain   PainLoc: Back     Body mass index is 28.56 kg/m².    Physical exam:    Gen: A and O x3, pleasant, well-groomed  Skin: No rashes or obvious lesions  HEENT: PERRLA, no obvious deformities on ears or in canals. Trachea midline.  CVS: Regular rate and rhythm, normal palpable pulses.  Resp:No increased work of breathing, symmetrical chest rise.  Abdomen: Soft, NT/ND.  Musculoskeletal:  No antalgic gait.           Neuro:    Iliopsoas Quadriceps Knee  Flexion Tibialis  anterior Gastro- cnemius EHL   Lower: R 5/5 5/5 5/5 5/5 5/5 5/5    L 5/5 4/5 5/5 5/5 5/5 5/5      Left  Right    Patellar DTR 1+ 1+   Achilles DTR 2+ 2+   Mariano Absent  Absent   Clonus Absent Absent   Babinski Absent Absent     Intact and symmetrical to light touch and pinprick in L1-S1 dermatomes bilaterally.    Lumbar spine:  Lumbar spine: ROM is full with flexion extension and oblique extension with increased pain on flexion.    Valentin's test causes no increased pain on either side.    Supine straight leg raise is positive for left leg pain at 45°.    Internal and external rotation of the hip causes no increased pain on either side.  Myofascial exam: No tenderness to palpation across lumbar paraspinous muscles.    Imagin22 MRI L-spine:  There is a trace retrolisthesis of L5 on S1.  Alignment of the rest of the vertebral bodies is within normal limits.  No marrow abnormalities to suggest acute fractures or neoplastic processes.  The tip of the conus is at T12 level.  " Paraspinal soft tissues are unremarkable.  Paraspinal soft tissues are unremarkable.   L5-S1: Approximately 2 mm retrolisthesis of L5 on S1.  There is a mild posterior bulging of the annulus.  No soft tissue disc herniations or significant central canal spinal stenosis or significant foraminal narrowing.   L4-5: There is mild disc space narrowing.  There is a left paracentral disc extrusion with craniad extension of the extrusion behind the inferior endplate of L4.  This results in mild compression of the thecal sac.  There is also mild left lateral recess stenosis.  Mild left foraminal stenosis.  Right neural foramen is unremarkable.  There is facet arthropathy.   L3-4: There is no disc herniations or significant central canal spinal stenosis or significant foraminal stenosis.  Mild facet arthropathy.   L2-3: No disc herniations or central canal spinal stenosis or foraminal narrowing.   L1-2: There is a small left paracentral disc protrusion with mild compression of thecal sac.  No significant central canal spinal stenosis.  There is no significant neural foraminal stenosis.   T12-L1: Schmorl's node along the inferior endplate of T12.  There is no central canal disc herniations or central canal spinal stenosis or foraminal stenotic disease.    Assessment:   North Mendoza is a 36 y.o. year old male patient who has no past medical history on file. He presents in referral from Dr. Jovanni Montez for back and left leg pain.    1. Left lumbar radiculopathy     2. DDD (degenerative disc disease), lumbar         Plan:  1.  We reviewed his symptoms, physical exam, MRI and he does have a disc extrusion at L4/5 which extends cephalad and contacts the L4 nerve root.  He also has some mild foraminal narrowing at L3/4 off to the left side.  His pain is corresponding to his left L4 nerve root but also seems to have possibly have slight weakness in his left quadricep.  We discussed that he has tried physician directed  exercises for greater than a month, anti-inflammatories, time and since the pain continues, her recommend that we try an epidural steroid injection and he would like to proceed.  I will set him up for an L4/5 LISA with spread to the left side.  We discussed that if he has complete relief that we will slowly get him back in to his previous exercise routine.  If he has relief but not 100%, we may repeat this.  We discussed that he does not seem to have any jermaine weakness but if he develops weakness, we will refer him to Neurosurgery but hopefully we can avoid this.    Thank you for referring this interesting patient, and I look forward to continuing to collaborate in his care.

## 2022-07-03 ENCOUNTER — LAB VISIT (OUTPATIENT)
Dept: FAMILY MEDICINE | Facility: CLINIC | Age: 37
End: 2022-07-03
Payer: MEDICAID

## 2022-07-03 DIAGNOSIS — Z11.52 ENCOUNTER FOR SCREENING FOR SEVERE ACUTE RESPIRATORY SYNDROME CORONAVIRUS 2 (SARS-COV-2) INFECTION: Primary | ICD-10-CM

## 2022-07-03 PROCEDURE — U0005 INFEC AGEN DETEC AMPLI PROBE: HCPCS | Performed by: ANESTHESIOLOGY

## 2022-07-03 PROCEDURE — U0003 INFECTIOUS AGENT DETECTION BY NUCLEIC ACID (DNA OR RNA); SEVERE ACUTE RESPIRATORY SYNDROME CORONAVIRUS 2 (SARS-COV-2) (CORONAVIRUS DISEASE [COVID-19]), AMPLIFIED PROBE TECHNIQUE, MAKING USE OF HIGH THROUGHPUT TECHNOLOGIES AS DESCRIBED BY CMS-2020-01-R: HCPCS | Performed by: ANESTHESIOLOGY

## 2022-07-04 LAB
SARS-COV-2 RNA RESP QL NAA+PROBE: NOT DETECTED
SARS-COV-2- CYCLE NUMBER: NORMAL

## 2022-07-06 ENCOUNTER — HOSPITAL ENCOUNTER (OUTPATIENT)
Dept: RADIOLOGY | Facility: HOSPITAL | Age: 37
Discharge: HOME | End: 2022-07-06
Attending: ANESTHESIOLOGY
Payer: MEDICAID

## 2022-07-06 ENCOUNTER — HOSPITAL ENCOUNTER (OUTPATIENT)
Facility: HOSPITAL | Age: 37
Discharge: HOME | End: 2022-07-06
Attending: ANESTHESIOLOGY | Admitting: ANESTHESIOLOGY
Payer: MEDICAID

## 2022-07-06 VITALS
TEMPERATURE: 98 F | SYSTOLIC BLOOD PRESSURE: 108 MMHG | DIASTOLIC BLOOD PRESSURE: 70 MMHG | BODY MASS INDEX: 29.82 KG/M2 | WEIGHT: 190 LBS | OXYGEN SATURATION: 97 % | RESPIRATION RATE: 16 BRPM | HEIGHT: 67 IN | HEART RATE: 74 BPM

## 2022-07-06 DIAGNOSIS — M54.50 LOWER BACK PAIN: ICD-10-CM

## 2022-07-06 DIAGNOSIS — M54.16 LUMBAR RADICULOPATHY: ICD-10-CM

## 2022-07-06 PROCEDURE — 62323 PR INJ LUMBAR/SACRAL, W/IMAGING GUIDANCE: ICD-10-PCS | Mod: ,,, | Performed by: ANESTHESIOLOGY

## 2022-07-06 PROCEDURE — 62323 NJX INTERLAMINAR LMBR/SAC: CPT | Mod: ,,, | Performed by: ANESTHESIOLOGY

## 2022-07-06 PROCEDURE — 25000003 PHARM REV CODE 250: Mod: PO | Performed by: ANESTHESIOLOGY

## 2022-07-06 PROCEDURE — 62323 NJX INTERLAMINAR LMBR/SAC: CPT | Mod: PO | Performed by: ANESTHESIOLOGY

## 2022-07-06 PROCEDURE — A4216 STERILE WATER/SALINE, 10 ML: HCPCS | Mod: PO | Performed by: ANESTHESIOLOGY

## 2022-07-06 PROCEDURE — 25500020 PHARM REV CODE 255: Mod: PO | Performed by: ANESTHESIOLOGY

## 2022-07-06 PROCEDURE — 76000 FLUOROSCOPY <1 HR PHYS/QHP: CPT | Mod: TC,PO

## 2022-07-06 PROCEDURE — 63600175 PHARM REV CODE 636 W HCPCS: Mod: PO | Performed by: ANESTHESIOLOGY

## 2022-07-06 RX ORDER — ALPRAZOLAM 0.5 MG/1
1 TABLET, ORALLY DISINTEGRATING ORAL ONCE AS NEEDED
Status: COMPLETED | OUTPATIENT
Start: 2022-07-06 | End: 2022-07-06

## 2022-07-06 RX ORDER — SODIUM CHLORIDE 9 MG/ML
INJECTION, SOLUTION INTRAMUSCULAR; INTRAVENOUS; SUBCUTANEOUS
Status: DISCONTINUED | OUTPATIENT
Start: 2022-07-06 | End: 2022-07-06 | Stop reason: HOSPADM

## 2022-07-06 RX ORDER — METHYLPREDNISOLONE ACETATE 80 MG/ML
INJECTION, SUSPENSION INTRA-ARTICULAR; INTRALESIONAL; INTRAMUSCULAR; SOFT TISSUE
Status: DISCONTINUED | OUTPATIENT
Start: 2022-07-06 | End: 2022-07-06 | Stop reason: HOSPADM

## 2022-07-06 RX ORDER — LIDOCAINE HYDROCHLORIDE 10 MG/ML
INJECTION, SOLUTION EPIDURAL; INFILTRATION; INTRACAUDAL; PERINEURAL
Status: DISCONTINUED | OUTPATIENT
Start: 2022-07-06 | End: 2022-07-06 | Stop reason: HOSPADM

## 2022-07-06 RX ADMIN — ALPRAZOLAM 1 MG: 0.5 TABLET, ORALLY DISINTEGRATING ORAL at 01:07

## 2022-07-06 NOTE — DISCHARGE SUMMARY
[facility] - Surgery  Discharge Note  Short Stay    Procedure(s) (LRB):  Injection-steroid-epidural-lumbar L4/5 (N/A)    OUTCOME: Patient tolerated treatment/procedure well without complication and is now ready for discharge.    DISPOSITION: Home or Self Care    FINAL DIAGNOSIS:  Lumbar radiculopathy    FOLLOWUP: In clinic    DISCHARGE INSTRUCTIONS:    Discharge Procedure Orders   Diet Adult Regular     No dressing needed     Notify your health care provider if you experience any of the following:  temperature >100.4     Activity as tolerated

## 2022-07-06 NOTE — OP NOTE
PROCEDURE DATE: 7/6/2022    Lumbar Interlaminar Epidural Steroid Injection under Fluoroscopic Guidance, AP Approach.    Procedure:   Interlaminar epidural steroid injection at L4/5 under fluoroscopic guidance.    Diagnosis: lUMBAR Radiculopathy    pOSTOP DIAGNOSIS: sAME    Physician: Shaun Huang M.D.    Medications injected:80 mg methylprednisone with 4 ml of preservative free NaCl    Local anesthetic injected:    Lidocaine 1% 4 ml total    Sedation Medications: none    Estimated blood loss:  none    Complications:  none    Technique:  Time-out taken to identify patient and procedure prior to starting the procedure.  With the patient laying in a prone position, the area was prepped and draped in the usual sterile fashion using ChloraPrep and a fenestrated drape.  After determining the target level with an AP fluoroscopic view, local anesthetic was given using a 25-gauge 1.5 inch needle by raising a wheal and then infiltrating toward the interlaminar entry space.  A 3.5inch 20-gauge Touhy needle was introduced under AP fluoroscopic guidance to the interlaminar space of L4/5. Once the trajectory was established, the needle was visualized in the lateral view and advanced using loss of resistance technique. Once in the desired position, omnipaque contrast was injected to confirm placement and there was no vascular uptake nor intrathecal spread.  The medication was then injected slowly. The patient tolerated the procedure well.      The patient was monitored after the procedure.   They were given post-procedure and discharge instructions to follow at home.  The patient was discharged in a stable condition.

## 2022-08-04 ENCOUNTER — OFFICE VISIT (OUTPATIENT)
Dept: PAIN MEDICINE | Facility: CLINIC | Age: 37
End: 2022-08-04
Payer: MEDICAID

## 2022-08-04 VITALS
HEIGHT: 67 IN | DIASTOLIC BLOOD PRESSURE: 68 MMHG | WEIGHT: 215.38 LBS | SYSTOLIC BLOOD PRESSURE: 130 MMHG | BODY MASS INDEX: 33.8 KG/M2 | OXYGEN SATURATION: 98 % | HEART RATE: 75 BPM

## 2022-08-04 DIAGNOSIS — M51.36 DDD (DEGENERATIVE DISC DISEASE), LUMBAR: ICD-10-CM

## 2022-08-04 DIAGNOSIS — M54.16 LUMBAR RADICULOPATHY: Primary | ICD-10-CM

## 2022-08-04 PROCEDURE — 99213 OFFICE O/P EST LOW 20 MIN: CPT | Mod: PBBFAC,PN | Performed by: PHYSICIAN ASSISTANT

## 2022-08-04 PROCEDURE — 99999 PR PBB SHADOW E&M-EST. PATIENT-LVL III: CPT | Mod: PBBFAC,,, | Performed by: PHYSICIAN ASSISTANT

## 2022-08-04 PROCEDURE — 3075F PR MOST RECENT SYSTOLIC BLOOD PRESS GE 130-139MM HG: ICD-10-PCS | Mod: CPTII,,, | Performed by: PHYSICIAN ASSISTANT

## 2022-08-04 PROCEDURE — 3008F BODY MASS INDEX DOCD: CPT | Mod: CPTII,,, | Performed by: PHYSICIAN ASSISTANT

## 2022-08-04 PROCEDURE — 3075F SYST BP GE 130 - 139MM HG: CPT | Mod: CPTII,,, | Performed by: PHYSICIAN ASSISTANT

## 2022-08-04 PROCEDURE — 99999 PR PBB SHADOW E&M-EST. PATIENT-LVL III: ICD-10-PCS | Mod: PBBFAC,,, | Performed by: PHYSICIAN ASSISTANT

## 2022-08-04 PROCEDURE — 99214 OFFICE O/P EST MOD 30 MIN: CPT | Mod: S$PBB,,, | Performed by: PHYSICIAN ASSISTANT

## 2022-08-04 PROCEDURE — 3008F PR BODY MASS INDEX (BMI) DOCUMENTED: ICD-10-PCS | Mod: CPTII,,, | Performed by: PHYSICIAN ASSISTANT

## 2022-08-04 PROCEDURE — 3078F DIAST BP <80 MM HG: CPT | Mod: CPTII,,, | Performed by: PHYSICIAN ASSISTANT

## 2022-08-04 PROCEDURE — 1160F RVW MEDS BY RX/DR IN RCRD: CPT | Mod: CPTII,,, | Performed by: PHYSICIAN ASSISTANT

## 2022-08-04 PROCEDURE — 3078F PR MOST RECENT DIASTOLIC BLOOD PRESSURE < 80 MM HG: ICD-10-PCS | Mod: CPTII,,, | Performed by: PHYSICIAN ASSISTANT

## 2022-08-04 PROCEDURE — 1160F PR REVIEW ALL MEDS BY PRESCRIBER/CLIN PHARMACIST DOCUMENTED: ICD-10-PCS | Mod: CPTII,,, | Performed by: PHYSICIAN ASSISTANT

## 2022-08-04 PROCEDURE — 1159F PR MEDICATION LIST DOCUMENTED IN MEDICAL RECORD: ICD-10-PCS | Mod: CPTII,,, | Performed by: PHYSICIAN ASSISTANT

## 2022-08-04 PROCEDURE — 99214 PR OFFICE/OUTPT VISIT, EST, LEVL IV, 30-39 MIN: ICD-10-PCS | Mod: S$PBB,,, | Performed by: PHYSICIAN ASSISTANT

## 2022-08-04 PROCEDURE — 1159F MED LIST DOCD IN RCRD: CPT | Mod: CPTII,,, | Performed by: PHYSICIAN ASSISTANT

## 2022-08-04 RX ORDER — SODIUM CHLORIDE, SODIUM LACTATE, POTASSIUM CHLORIDE, CALCIUM CHLORIDE 600; 310; 30; 20 MG/100ML; MG/100ML; MG/100ML; MG/100ML
INJECTION, SOLUTION INTRAVENOUS CONTINUOUS
Status: CANCELLED | OUTPATIENT
Start: 2022-08-16

## 2022-08-04 NOTE — PROGRESS NOTES
This note was completed with dictation software and grammatical errors may exist.    Chief Complaint   Patient presents with    Follow-up        HPI: [place] Mendoza is a 36 y.o. year old male patient who has no past medical history on file. He presents in referral from No ref. provider found for back and left leg pain.  He is status post L4/5 interlaminar epidural steroid injection on 07/06/2022 with 85% relief.  The patient is new to me.  He states that he was having difficulty walking prior to the injection but now he can walk without any issue.  He does still have daily pain in his left low back that wraps around to his left anterior thigh.  This is typically worse after sitting and then going to a standing position.  He has some difficulty standing up straight because he will feel that the pain is getting worse so he has some apprehension to stand up straight or to bend over.  He denies having any weakness or numbness, denies bladder or bowel incontinence.    Previous history:  Patient reports that in January he was pulling a spring out of a vault during in gymnastics meet and he felt a pop in his back and had sudden back pain and left leg pain.  He sought care and was given anti-inflammatories and worked on home exercises directed by his physician at the time, Félix exercises and the pain gradually resolved after about a month.  He was actually doing fairly well without any major issues and then had several episodes in February but in June had a return of the severe pain in the back and left leg to the point where he was having difficulty walking.  He has continued these exercises on his own which she does get some relief with extension but he continues to have severe pain.  He is having difficulty sitting for very long, sometimes difficulty with walking, is a  for his job and feels that the left leg is weak and is going to give out at times.  He denies any bowel or bladder  "incontinence.  The pain is been in the back, lateral left leg, anterior thigh and at times on the medial lower left leg.  He describes the pain as aching, throbbing, tight, numb and shooting.  Is worse with sitting bending, improved with arching his back.    Pain intervention history: He is status post L4/5 interlaminar epidural steroid injection on 07/06/2022 with 85% relief.     Spine surgeries:    Antineuropathics:  NSAIDs:  Ibuprofen, prednisone  Physical therapy:  Antidepressants:  Muscle relaxers:  Flexeril  Opioids:  Tramadol  Antiplatelets/Anticoagulants:    ROS:  He reports back pain only.  Balance of review of systems is negative.    No results found for: LABA1C, HGBA1C    No results found for: WBC, HGB, HCT, MCV, PLT    History reviewed. No pertinent past medical history.    Past Surgical History:   Procedure Laterality Date    EPIDURAL STEROID INJECTION INTO LUMBAR SPINE N/A 7/6/2022    Procedure: Injection-steroid-epidural-lumbar L4/5;  Surgeon: Shaun Huang MD;  [facility]: [facility];  Service: Pain Management;  Laterality: N/A;       Social History     Socioeconomic History    Marital status:    Tobacco Use    Smoking status: Never Smoker    Smokeless tobacco: Never Used   Substance and Sexual Activity    Alcohol use: Not Currently    Drug use: Not Currently   Social History Narrative    ** Merged History Encounter **              Medications/Allergies: See med card    Vitals:    08/04/22 0839   BP: 130/68   Pulse: 75   SpO2: 98%   Weight: 97.7 kg (215 lb 6.2 oz)   Height: 5' 7" (1.702 m)   PainSc:   2   PainLoc: Back     Body mass index is 33.73 kg/m².    Physical exam:  Gen: A and O x3, pleasant, well-groomed  Skin: No rashes or obvious lesions  HEENT: PERRLA, no obvious deformities on ears or in canals. Trachea midline.  CVS: Regular rate and rhythm, normal palpable pulses.  Resp:No increased work of breathing, symmetrical chest rise.  Abdomen: Soft, NT/ND.  Musculoskeletal:  No " antalgic gait.     Neuro:    Iliopsoas Quadriceps Knee  Flexion Tibialis  anterior Gastro- cnemius EHL   Lower: R 5/5 5/5 5/5 5/5 5/5 5/5    L 5/5 5/5 5/5 5/5 5/5 5/5      Left  Right    Patellar DTR 2+ 2+   Achilles DTR 2+ 2+   Mariano Absent  Absent   Clonus Absent Absent   Babinski Absent Absent     Intact and symmetrical to light touch and pinprick in L1-S1 dermatomes bilaterally.    Lumbar spine:  Lumbar spine: ROM is full with flexion extension and oblique extension with increased pain on flexion.  Valentin's test causes no increased pain on either side.    Supine straight leg raise is positive for left leg pain at 45°.  Internal and external rotation of the hip causes no increased pain on either side.  Myofascial exam: No tenderness to palpation across lumbar paraspinous muscles.    Imagin22 MRI L-spine:  There is a trace retrolisthesis of L5 on S1.  Alignment of the rest of the vertebral bodies is within normal limits.  No marrow abnormalities to suggest acute fractures or neoplastic processes.  The tip of the conus is at T12 level.  Paraspinal soft tissues are unremarkable.  Paraspinal soft tissues are unremarkable.   L5-S1: Approximately 2 mm retrolisthesis of L5 on S1.  There is a mild posterior bulging of the annulus.  No soft tissue disc herniations or significant central canal spinal stenosis or significant foraminal narrowing.   L4-5: There is mild disc space narrowing.  There is a left paracentral disc extrusion with craniad extension of the extrusion behind the inferior endplate of L4.  This results in mild compression of the thecal sac.  There is also mild left lateral recess stenosis.  Mild left foraminal stenosis.  Right neural foramen is unremarkable.  There is facet arthropathy.   L3-4: There is no disc herniations or significant central canal spinal stenosis or significant foraminal stenosis.  Mild facet arthropathy.   L2-3: No disc herniations or central canal spinal stenosis or  foraminal narrowing.   L1-2: There is a small left paracentral disc protrusion with mild compression of thecal sac.  No significant central canal spinal stenosis.  There is no significant neural foraminal stenosis.   T12-L1: Schmorl's node along the inferior endplate of T12.  There is no central canal disc herniations or central canal spinal stenosis or foraminal stenotic disease.    Assessment:   [place] Mendoza is a 36 y.o. year old male patient who has no past medical history on file. He presents in referral from Dr. Jovanni Montez for back and left leg pain.    1. Lumbar radiculopathy     2. DDD (degenerative disc disease), lumbar         Plan:  1. The patient did well following the L4/5 interlaminar epidural steroid injection to the left and I am going to schedule him to repeat this to see if he can get closer to full relief.  2. Follow-up in 4 weeks postprocedure or sooner as needed.

## 2022-09-16 ENCOUNTER — OFFICE VISIT (OUTPATIENT)
Dept: PAIN MEDICINE | Facility: CLINIC | Age: 37
End: 2022-09-16
Payer: MEDICAID

## 2022-09-16 VITALS
BODY MASS INDEX: 31.99 KG/M2 | HEART RATE: 87 BPM | SYSTOLIC BLOOD PRESSURE: 135 MMHG | DIASTOLIC BLOOD PRESSURE: 70 MMHG | HEIGHT: 67 IN | WEIGHT: 203.81 LBS | OXYGEN SATURATION: 97 %

## 2022-09-16 DIAGNOSIS — M51.36 DDD (DEGENERATIVE DISC DISEASE), LUMBAR: ICD-10-CM

## 2022-09-16 DIAGNOSIS — M54.16 LUMBAR RADICULOPATHY: Primary | ICD-10-CM

## 2022-09-16 PROCEDURE — 3078F DIAST BP <80 MM HG: CPT | Mod: CPTII,,, | Performed by: PHYSICIAN ASSISTANT

## 2022-09-16 PROCEDURE — 99213 OFFICE O/P EST LOW 20 MIN: CPT | Mod: PBBFAC,PN | Performed by: PHYSICIAN ASSISTANT

## 2022-09-16 PROCEDURE — 99999 PR PBB SHADOW E&M-EST. PATIENT-LVL III: ICD-10-PCS | Mod: PBBFAC,,, | Performed by: PHYSICIAN ASSISTANT

## 2022-09-16 PROCEDURE — 3075F PR MOST RECENT SYSTOLIC BLOOD PRESS GE 130-139MM HG: ICD-10-PCS | Mod: CPTII,,, | Performed by: PHYSICIAN ASSISTANT

## 2022-09-16 PROCEDURE — 1160F PR REVIEW ALL MEDS BY PRESCRIBER/CLIN PHARMACIST DOCUMENTED: ICD-10-PCS | Mod: CPTII,,, | Performed by: PHYSICIAN ASSISTANT

## 2022-09-16 PROCEDURE — 3008F BODY MASS INDEX DOCD: CPT | Mod: CPTII,,, | Performed by: PHYSICIAN ASSISTANT

## 2022-09-16 PROCEDURE — 1159F PR MEDICATION LIST DOCUMENTED IN MEDICAL RECORD: ICD-10-PCS | Mod: CPTII,,, | Performed by: PHYSICIAN ASSISTANT

## 2022-09-16 PROCEDURE — 99212 OFFICE O/P EST SF 10 MIN: CPT | Mod: S$PBB,,, | Performed by: PHYSICIAN ASSISTANT

## 2022-09-16 PROCEDURE — 1160F RVW MEDS BY RX/DR IN RCRD: CPT | Mod: CPTII,,, | Performed by: PHYSICIAN ASSISTANT

## 2022-09-16 PROCEDURE — 3078F PR MOST RECENT DIASTOLIC BLOOD PRESSURE < 80 MM HG: ICD-10-PCS | Mod: CPTII,,, | Performed by: PHYSICIAN ASSISTANT

## 2022-09-16 PROCEDURE — 3008F PR BODY MASS INDEX (BMI) DOCUMENTED: ICD-10-PCS | Mod: CPTII,,, | Performed by: PHYSICIAN ASSISTANT

## 2022-09-16 PROCEDURE — 3075F SYST BP GE 130 - 139MM HG: CPT | Mod: CPTII,,, | Performed by: PHYSICIAN ASSISTANT

## 2022-09-16 PROCEDURE — 1159F MED LIST DOCD IN RCRD: CPT | Mod: CPTII,,, | Performed by: PHYSICIAN ASSISTANT

## 2022-09-16 PROCEDURE — 99999 PR PBB SHADOW E&M-EST. PATIENT-LVL III: CPT | Mod: PBBFAC,,, | Performed by: PHYSICIAN ASSISTANT

## 2022-09-16 PROCEDURE — 99212 PR OFFICE/OUTPT VISIT, EST, LEVL II, 10-19 MIN: ICD-10-PCS | Mod: S$PBB,,, | Performed by: PHYSICIAN ASSISTANT

## 2022-09-19 NOTE — PROGRESS NOTES
This note was completed with dictation software and grammatical errors may exist.    Chief Complaint   Patient presents with    Follow-up        HPI: [place] Mendoza is a 36 y.o. year old male patient who has no past medical history on file. He presents in referral from Dr. Jovanni Montez for back and left leg pain.  He is status post L4/5 interlaminar epidural steroid injection on 8/23/22/2022 with 95% relief.  He is pleased with these results and has minimal pain at this time. He denies having any weakness or numbness, denies bladder or bowel incontinence.    Previous history:  Patient reports that in January he was pulling a spring out of a vault during in gymnastics meet and he felt a pop in his back and had sudden back pain and left leg pain.  He sought care and was given anti-inflammatories and worked on home exercises directed by his physician at the time, Félix exercises and the pain gradually resolved after about a month.  He was actually doing fairly well without any major issues and then had several episodes in February but in June had a return of the severe pain in the back and left leg to the point where he was having difficulty walking.  He has continued these exercises on his own which she does get some relief with extension but he continues to have severe pain.  He is having difficulty sitting for very long, sometimes difficulty with walking, is a  for his job and feels that the left leg is weak and is going to give out at times.  He denies any bowel or bladder incontinence.  The pain is been in the back, lateral left leg, anterior thigh and at times on the medial lower left leg.  He describes the pain as aching, throbbing, tight, numb and shooting.  Is worse with sitting bending, improved with arching his back.    Pain intervention history: He is status post L4/5 interlaminar epidural steroid injection on 07/06/2022 with 85% relief. He is status post L4/5 interlaminar  "epidural steroid injection on 8/23/22/2022 with 95% relief.     Spine surgeries:    Antineuropathics:  NSAIDs:  Ibuprofen, prednisone  Physical therapy:  Antidepressants:  Muscle relaxers:  Flexeril  Opioids:  Tramadol  Antiplatelets/Anticoagulants:    ROS:  He reports back pain only.  Balance of review of systems is negative.    No results found for: LABA1C, HGBA1C    No results found for: WBC, HGB, HCT, MCV, PLT    History reviewed. No pertinent past medical history.    Past Surgical History:   Procedure Laterality Date    EPIDURAL STEROID INJECTION INTO LUMBAR SPINE N/A 7/6/2022    Procedure: Injection-steroid-epidural-lumbar L4/5;  Surgeon: Shaun Huang MD;  Location: [facility];  Service: Pain Management;  Laterality: N/A;    EPIDURAL STEROID INJECTION INTO LUMBAR SPINE Left 8/23/2022    Procedure: Injection-steroid-epidural-lumbar L4/5 to Left;  Surgeon: Shaun Huang MD;  Location: [facility];  Service: Pain Management;  Laterality: Left;       Social History     Socioeconomic History    Marital status:    Tobacco Use    Smoking status: Never    Smokeless tobacco: Never   Substance and Sexual Activity    Alcohol use: Yes     Comment: Social    Drug use: Not Currently   Social History Narrative    ** Merged History Encounter **              Medications/Allergies: See med card    Vitals:    09/16/22 1417   BP: 135/70   Pulse: 87   SpO2: 97%   Weight: 92.4 kg (203 lb 13 oz)   Height: 5' 7" (1.702 m)   PainSc: 0-No pain     Body mass index is 31.92 kg/m².    Physical exam:  Gen: A and O x3, pleasant, well-groomed  Skin: No rashes or obvious lesions  HEENT: PERRLA, no obvious deformities on ears or in canals. Trachea midline.  CVS: Regular rate and rhythm, normal palpable pulses.  Resp:No increased work of breathing, symmetrical chest rise.  Abdomen: Soft, NT/ND.  Musculoskeletal:  No antalgic gait.     Neuro:    Iliopsoas Quadriceps Knee  Flexion Tibialis  anterior Gastro- cnemius EHL   Lower: R " 5/5 5/5 5/5 5/5 5/5 5/5    L 5/5 5/5 5/5 5/5 5/5 5/5      Left  Right    Patellar DTR 2+ 2+   Achilles DTR 2+ 2+   Mariano Absent  Absent   Clonus Absent Absent   Babinski Absent Absent     Intact and symmetrical to light touch and pinprick in L1-S1 dermatomes bilaterally.    Lumbar spine:  Lumbar spine: ROM is full with flexion extension and oblique extension with increased pain on flexion.  Valentin's test causes no increased pain on either side.    Supine straight leg raise is positive for left leg pain at 45°.  Internal and external rotation of the hip causes no increased pain on either side.  Myofascial exam: No tenderness to palpation across lumbar paraspinous muscles.    Imagin22 MRI L-spine:  There is a trace retrolisthesis of L5 on S1.  Alignment of the rest of the vertebral bodies is within normal limits.  No marrow abnormalities to suggest acute fractures or neoplastic processes.  The tip of the conus is at T12 level.  Paraspinal soft tissues are unremarkable.  Paraspinal soft tissues are unremarkable.   L5-S1: Approximately 2 mm retrolisthesis of L5 on S1.  There is a mild posterior bulging of the annulus.  No soft tissue disc herniations or significant central canal spinal stenosis or significant foraminal narrowing.   L4-5: There is mild disc space narrowing.  There is a left paracentral disc extrusion with craniad extension of the extrusion behind the inferior endplate of L4.  This results in mild compression of the thecal sac.  There is also mild left lateral recess stenosis.  Mild left foraminal stenosis.  Right neural foramen is unremarkable.  There is facet arthropathy.   L3-4: There is no disc herniations or significant central canal spinal stenosis or significant foraminal stenosis.  Mild facet arthropathy.   L2-3: No disc herniations or central canal spinal stenosis or foraminal narrowing.   L1-2: There is a small left paracentral disc protrusion with mild compression of thecal sac.  No  significant central canal spinal stenosis.  There is no significant neural foraminal stenosis.   T12-L1: Schmorl's node along the inferior endplate of T12.  There is no central canal disc herniations or central canal spinal stenosis or foraminal stenotic disease.    Assessment:   North Mendoza is a 36 y.o. year old male patient who has no past medical history on file. He presents in referral from Dr. Jovanni Montez for back and left leg pain.    1. Lumbar radiculopathy        2. DDD (degenerative disc disease), lumbar  Ambulatory referral/consult to Pain Clinic          Plan:  1. The patient did well following the 2nd L4/5 interlaminar epidural steroid injection to the left.  This can be repeated in the future if necessary.   2. Follow-up as needed.

## (undated) DEVICE — NDL TUOHY EPIDURAL 20G X 3.5

## (undated) DEVICE — APPLICATOR CHLORAPREP CLR 10.5

## (undated) DEVICE — TRAY NERVE BLOCK

## (undated) DEVICE — MARKER SKIN STND TIP BLUE BARR

## (undated) DEVICE — SYR GLASS 5CC LUER LOK

## (undated) DEVICE — GLOVE SURGICAL LATEX SZ 7